# Patient Record
Sex: MALE | Race: WHITE | NOT HISPANIC OR LATINO
[De-identification: names, ages, dates, MRNs, and addresses within clinical notes are randomized per-mention and may not be internally consistent; named-entity substitution may affect disease eponyms.]

---

## 2022-10-04 ENCOUNTER — APPOINTMENT (OUTPATIENT)
Dept: VASCULAR SURGERY | Facility: CLINIC | Age: 52
End: 2022-10-04

## 2022-10-27 PROBLEM — Z00.00 ENCOUNTER FOR PREVENTIVE HEALTH EXAMINATION: Status: ACTIVE | Noted: 2022-10-27

## 2022-11-09 ENCOUNTER — APPOINTMENT (OUTPATIENT)
Dept: VASCULAR SURGERY | Facility: CLINIC | Age: 52
End: 2022-11-09

## 2022-11-09 PROCEDURE — 99204 OFFICE O/P NEW MOD 45 MIN: CPT

## 2022-12-14 ENCOUNTER — APPOINTMENT (OUTPATIENT)
Dept: VASCULAR SURGERY | Facility: CLINIC | Age: 52
End: 2022-12-14

## 2022-12-14 PROCEDURE — 99214 OFFICE O/P EST MOD 30 MIN: CPT

## 2023-03-07 ENCOUNTER — APPOINTMENT (OUTPATIENT)
Dept: VASCULAR SURGERY | Facility: CLINIC | Age: 53
End: 2023-03-07
Payer: COMMERCIAL

## 2023-03-07 VITALS — SYSTOLIC BLOOD PRESSURE: 141 MMHG | HEART RATE: 69 BPM | DIASTOLIC BLOOD PRESSURE: 85 MMHG

## 2023-03-07 VITALS — SYSTOLIC BLOOD PRESSURE: 157 MMHG | DIASTOLIC BLOOD PRESSURE: 90 MMHG | HEART RATE: 67 BPM

## 2023-03-07 PROCEDURE — 36475 ENDOVENOUS RF 1ST VEIN: CPT | Mod: LT

## 2023-03-13 NOTE — PROCEDURE
[FreeTextEntry1] : Left lower extremity GSV radiofrequency ablation [FreeTextEntry2] : Post veins venous reflux. [FreeTextEntry3] : The patient was seen in the waiting room where the consent was obtained.  Then the patient was  taken to the procedure room where a timeout was called to verify her identity and the laterality of the procedure. The patient's  leg was  interrogated under ultrasound and an appropriate place for cannulation was identified. The leg  was  prepped  with chloroprep and draped in the standard fashion. Under ultrasound guidance  1% plain lidocaine was injected  in the venous access site. Access was then obtained with a 7 FR sheath in the standard fashion using a micropuncture technique. Inner dilator was removed. The sheath was irrigated. RFA catheter was placed to the SFJ and withdrawn 3.0 cm from the junction.  Tumescence was administered around the saphenous vein under ultrasound guidance. The ablation was  performed Using a total of 9 cycles. The catheter and sheath were withdrawn. Complete hemostasis was achieved with manual compression. Steri strips were applied to catheter insertion site. Leg was wrapped in Kerlix and an ace wrap. Patient tolerated the procedure well and verbalized understanding of post-procedure instructions. The patient was discharged in stable condition.\par

## 2023-03-14 ENCOUNTER — APPOINTMENT (OUTPATIENT)
Dept: VASCULAR SURGERY | Facility: CLINIC | Age: 53
End: 2023-03-14
Payer: COMMERCIAL

## 2023-03-14 VITALS
BODY MASS INDEX: 39.17 KG/M2 | WEIGHT: 315 LBS | HEIGHT: 75 IN | SYSTOLIC BLOOD PRESSURE: 161 MMHG | DIASTOLIC BLOOD PRESSURE: 98 MMHG | HEART RATE: 72 BPM

## 2023-03-14 DIAGNOSIS — I83.899 VARICOSE VEINS OF UNSPECIFIED LOWER EXTREMITY WITH OTHER COMPLICATIONS: ICD-10-CM

## 2023-03-14 DIAGNOSIS — I87.2 VENOUS INSUFFICIENCY (CHRONIC) (PERIPHERAL): ICD-10-CM

## 2023-03-14 PROCEDURE — 99214 OFFICE O/P EST MOD 30 MIN: CPT

## 2023-03-14 PROCEDURE — 93971 EXTREMITY STUDY: CPT

## 2023-03-15 PROBLEM — I83.899 VARICOSE VEINS WITH COMPLICATIONS: Status: ACTIVE | Noted: 2023-03-13

## 2023-03-15 PROBLEM — I87.2 CHRONIC VENOUS INSUFFICIENCY: Status: ACTIVE | Noted: 2023-03-13

## 2023-03-15 NOTE — PROCEDURE
[FreeTextEntry1] : venous US ordered, performed and reviewed. appropriately ablated left GSV. No DVT or EHIT.

## 2023-03-15 NOTE — HISTORY OF PRESENT ILLNESS
[FreeTextEntry1] : 52 year-old male with a past medical history of a flutter (on Xarelto), HTN, HLD, DM and extensive family history of varicose veins presented for a follow-up of bilateral lower extremity symptomatic varicose veins and edema. SYmptoms have been present for years. He reports that they got progressively worse over time. Associated with throbbing, discomfort, and pain. Alleviated by leg elevation and rest. Aggravated by prolonged activity and standing. Most pronounced in the evenings, especially after a busy day with prolonged standing.Varicose veins present bilaterally L>R. No history of ulcerations, cellulitis, or extensive venous changes. Patient attempted a course of nonoperative management without relief of his symptoms. he continues to remain symptomatic and now complains of significant pain and discomfort especially with bending the knees due to severe edema and varicose veins in the distribution surrounding the knees. 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 [de-identified] : 53 yo male s/p an ablation of the left GSV. He is doing well post-procedure. Pain controlled. No tenderness.  The patient no longer has heaviness or pain secondary to his varicose veins. However he has extremely extensive veins that are still persistent.

## 2023-03-15 NOTE — PHYSICAL EXAM
[Normal Thyroid] : the thyroid was normal [Alert] : alert [Oriented to Person] : oriented to person [Oriented to Place] : oriented to place [Oriented to Time] : oriented to time [Calm] : calm [Varicose Veins Of Lower Extremities] : bilaterally [Ankle Swelling Bilaterally] : severe [JVD] : no jugular venous distention  [de-identified] : NAD, Awake and Alert. [de-identified] : NCAT, Extraocular muscles in tact. [de-identified] : Soft, NT,ND. No guarding. No HSM. No abdominal masses. [de-identified] : Normal muscle bulk and tone. FROM in all extremities. [de-identified] : AAOx3, CN II-XII intact. Strength 5/5 in all 4 extremities. Sensation intact throughout. [de-identified] : Appropriate affect.

## 2023-03-15 NOTE — ASSESSMENT
[FreeTextEntry1] : 53 y/o male status-post an ablation of the left GSV. The patient is doing very well after the procedure and no longer has heaviness or pain associated with his varicose veins. He underwent a venous duplex that demonstrated an appropriately ablated left GSV.  I explained to the patient that if the veins do not decompress in 2 to 3 weeks, we can discuss additional treatment options. \par \par He will follow up to assess his varicose veins at his convenience.\par \par 32 min \par \par  [Arterial/Venous Disease] : arterial/venous disease

## 2023-04-25 ENCOUNTER — APPOINTMENT (OUTPATIENT)
Dept: VASCULAR SURGERY | Facility: CLINIC | Age: 53
End: 2023-04-25